# Patient Record
Sex: FEMALE | Race: WHITE | Employment: FULL TIME
[De-identification: names, ages, dates, MRNs, and addresses within clinical notes are randomized per-mention and may not be internally consistent; named-entity substitution may affect disease eponyms.]

---

## 2017-01-17 ENCOUNTER — OFFICE VISIT (OUTPATIENT)
Facility: CLINIC | Age: 42
End: 2017-01-17

## 2017-01-17 VITALS
WEIGHT: 130 LBS | HEIGHT: 65 IN | DIASTOLIC BLOOD PRESSURE: 66 MMHG | OXYGEN SATURATION: 98 % | TEMPERATURE: 98.3 F | BODY MASS INDEX: 21.66 KG/M2 | SYSTOLIC BLOOD PRESSURE: 116 MMHG | HEART RATE: 74 BPM

## 2017-01-17 DIAGNOSIS — J06.9 UPPER RESPIRATORY TRACT INFECTION, UNSPECIFIED TYPE: Primary | ICD-10-CM

## 2017-01-17 PROCEDURE — 99214 OFFICE O/P EST MOD 30 MIN: CPT | Performed by: NURSE PRACTITIONER

## 2017-01-17 RX ORDER — FLUTICASONE PROPIONATE 50 MCG
1 SPRAY, SUSPENSION (ML) NASAL DAILY
Qty: 1 BOTTLE | Refills: 3 | Status: SHIPPED | OUTPATIENT
Start: 2017-01-17 | End: 2018-06-21

## 2017-01-17 RX ORDER — GUAIFENESIN AND CODEINE PHOSPHATE 100; 10 MG/5ML; MG/5ML
5 SOLUTION ORAL 2 TIMES DAILY PRN
Qty: 118 ML | Refills: 0 | Status: SHIPPED | OUTPATIENT
Start: 2017-01-17 | End: 2017-01-24

## 2017-01-17 ASSESSMENT — ENCOUNTER SYMPTOMS
SORE THROAT: 1
RHINORRHEA: 0
SINUS PRESSURE: 0
COUGH: 1

## 2018-06-21 ENCOUNTER — OFFICE VISIT (OUTPATIENT)
Dept: PRIMARY CARE CLINIC | Age: 43
End: 2018-06-21
Payer: COMMERCIAL

## 2018-06-21 VITALS
DIASTOLIC BLOOD PRESSURE: 72 MMHG | HEART RATE: 77 BPM | WEIGHT: 133 LBS | RESPIRATION RATE: 16 BRPM | SYSTOLIC BLOOD PRESSURE: 100 MMHG | HEIGHT: 65 IN | BODY MASS INDEX: 22.16 KG/M2 | TEMPERATURE: 97.9 F

## 2018-06-21 DIAGNOSIS — Z00.00 ROUTINE GENERAL MEDICAL EXAMINATION AT A HEALTH CARE FACILITY: Primary | ICD-10-CM

## 2018-06-21 DIAGNOSIS — R22.9 LOCALIZED SKIN MASS, LUMP, OR SWELLING: ICD-10-CM

## 2018-06-21 DIAGNOSIS — Z12.39 SCREENING FOR BREAST CANCER: ICD-10-CM

## 2018-06-21 DIAGNOSIS — N39.3 STRESS INCONTINENCE: ICD-10-CM

## 2018-06-21 PROCEDURE — 99396 PREV VISIT EST AGE 40-64: CPT | Performed by: NURSE PRACTITIONER

## 2018-06-21 ASSESSMENT — ENCOUNTER SYMPTOMS
DIARRHEA: 0
ABDOMINAL PAIN: 0
ROS SKIN COMMENTS: MASS
BACK PAIN: 0
RHINORRHEA: 0
SHORTNESS OF BREATH: 0
NAUSEA: 0
COUGH: 0
VOMITING: 0
CONSTIPATION: 0

## 2018-06-21 ASSESSMENT — PATIENT HEALTH QUESTIONNAIRE - PHQ9
2. FEELING DOWN, DEPRESSED OR HOPELESS: 0
SUM OF ALL RESPONSES TO PHQ9 QUESTIONS 1 & 2: 0
1. LITTLE INTEREST OR PLEASURE IN DOING THINGS: 0
SUM OF ALL RESPONSES TO PHQ QUESTIONS 1-9: 0

## 2018-07-31 ENCOUNTER — TELEPHONE (OUTPATIENT)
Dept: PRIMARY CARE CLINIC | Age: 43
End: 2018-07-31

## 2018-07-31 ENCOUNTER — HOSPITAL ENCOUNTER (EMERGENCY)
Age: 43
Discharge: HOME | End: 2018-07-31
Attending: EMERGENCY MEDICINE
Payer: COMMERCIAL

## 2018-07-31 ENCOUNTER — APPOINTMENT (OUTPATIENT)
Dept: ULTRASOUND IMAGING | Age: 43
End: 2018-07-31
Payer: COMMERCIAL

## 2018-07-31 VITALS
HEIGHT: 65 IN | SYSTOLIC BLOOD PRESSURE: 108 MMHG | DIASTOLIC BLOOD PRESSURE: 73 MMHG | WEIGHT: 135 LBS | BODY MASS INDEX: 22.49 KG/M2 | TEMPERATURE: 98.1 F | RESPIRATION RATE: 15 BRPM | OXYGEN SATURATION: 98 % | HEART RATE: 80 BPM

## 2018-07-31 DIAGNOSIS — O20.0 THREATENED MISCARRIAGE: Primary | ICD-10-CM

## 2018-07-31 DIAGNOSIS — N93.9 VAGINAL BLEEDING: ICD-10-CM

## 2018-07-31 LAB
-: NORMAL
ABO/RH: NORMAL
ABSOLUTE EOS #: 0.05 K/UL (ref 0–0.44)
ABSOLUTE IMMATURE GRANULOCYTE: <0.03 K/UL (ref 0–0.3)
ABSOLUTE LYMPH #: 2.08 K/UL (ref 1.1–3.7)
ABSOLUTE MONO #: 0.64 K/UL (ref 0.1–1.2)
AMORPHOUS: NORMAL
ANION GAP SERPL CALCULATED.3IONS-SCNC: 10 MMOL/L (ref 9–17)
BACTERIA: NORMAL
BASOPHILS # BLD: 0 % (ref 0–2)
BASOPHILS ABSOLUTE: <0.03 K/UL (ref 0–0.2)
BILIRUBIN URINE: NEGATIVE
BUN BLDV-MCNC: 22 MG/DL (ref 6–20)
BUN/CREAT BLD: ABNORMAL (ref 9–20)
CALCIUM SERPL-MCNC: 9 MG/DL (ref 8.6–10.4)
CASTS UA: NORMAL /LPF (ref 0–8)
CHLORIDE BLD-SCNC: 102 MMOL/L (ref 98–107)
CO2: 24 MMOL/L (ref 20–31)
COLOR: YELLOW
COMMENT UA: ABNORMAL
CREAT SERPL-MCNC: 0.72 MG/DL (ref 0.5–0.9)
CRYSTALS, UA: NORMAL /HPF
DIFFERENTIAL TYPE: ABNORMAL
EOSINOPHILS RELATIVE PERCENT: 1 % (ref 1–4)
EPITHELIAL CELLS UA: NORMAL /HPF (ref 0–5)
GFR AFRICAN AMERICAN: >60 ML/MIN
GFR NON-AFRICAN AMERICAN: >60 ML/MIN
GFR SERPL CREATININE-BSD FRML MDRD: ABNORMAL ML/MIN/{1.73_M2}
GFR SERPL CREATININE-BSD FRML MDRD: ABNORMAL ML/MIN/{1.73_M2}
GLUCOSE BLD-MCNC: 95 MG/DL (ref 70–99)
GLUCOSE URINE: NEGATIVE
HCG QUANTITATIVE: 3058 IU/L
HCG(URINE) PREGNANCY TEST: POSITIVE
HCT VFR BLD CALC: 35 % (ref 36.3–47.1)
HEMOGLOBIN: 11.7 G/DL (ref 11.9–15.1)
IMMATURE GRANULOCYTES: 0 %
KETONES, URINE: ABNORMAL
LEUKOCYTE ESTERASE, URINE: NEGATIVE
LYMPHOCYTES # BLD: 32 % (ref 24–43)
MCH RBC QN AUTO: 32.6 PG (ref 25.2–33.5)
MCHC RBC AUTO-ENTMCNC: 33.4 G/DL (ref 28.4–34.8)
MCV RBC AUTO: 97.5 FL (ref 82.6–102.9)
MONOCYTES # BLD: 10 % (ref 3–12)
MUCUS: NORMAL
NITRITE, URINE: NEGATIVE
NRBC AUTOMATED: 0 PER 100 WBC
OTHER OBSERVATIONS UA: NORMAL
PDW BLD-RTO: 11.5 % (ref 11.8–14.4)
PH UA: 5.5 (ref 5–8)
PLATELET # BLD: 150 K/UL (ref 138–453)
PLATELET ESTIMATE: ABNORMAL
PMV BLD AUTO: 10.9 FL (ref 8.1–13.5)
POTASSIUM SERPL-SCNC: 3.9 MMOL/L (ref 3.7–5.3)
PROTEIN UA: NEGATIVE
RBC # BLD: 3.59 M/UL (ref 3.95–5.11)
RBC # BLD: ABNORMAL 10*6/UL
RBC UA: NORMAL /HPF (ref 0–4)
RENAL EPITHELIAL, UA: NORMAL /HPF
SEG NEUTROPHILS: 57 % (ref 36–65)
SEGMENTED NEUTROPHILS ABSOLUTE COUNT: 3.74 K/UL (ref 1.5–8.1)
SODIUM BLD-SCNC: 136 MMOL/L (ref 135–144)
SPECIFIC GRAVITY UA: 1.04 (ref 1–1.03)
TRICHOMONAS: NORMAL
TURBIDITY: CLEAR
URINE HGB: ABNORMAL
UROBILINOGEN, URINE: NORMAL
WBC # BLD: 6.5 K/UL (ref 3.5–11.3)
WBC # BLD: ABNORMAL 10*3/UL
WBC UA: NORMAL /HPF (ref 0–5)
YEAST: NORMAL

## 2018-07-31 PROCEDURE — 80048 BASIC METABOLIC PNL TOTAL CA: CPT

## 2018-07-31 PROCEDURE — 86901 BLOOD TYPING SEROLOGIC RH(D): CPT

## 2018-07-31 PROCEDURE — 84703 CHORIONIC GONADOTROPIN ASSAY: CPT

## 2018-07-31 PROCEDURE — 84702 CHORIONIC GONADOTROPIN TEST: CPT

## 2018-07-31 PROCEDURE — 85025 COMPLETE CBC W/AUTO DIFF WBC: CPT

## 2018-07-31 PROCEDURE — 99284 EMERGENCY DEPT VISIT MOD MDM: CPT

## 2018-07-31 PROCEDURE — 81001 URINALYSIS AUTO W/SCOPE: CPT

## 2018-07-31 PROCEDURE — 86900 BLOOD TYPING SEROLOGIC ABO: CPT

## 2018-07-31 PROCEDURE — 76817 TRANSVAGINAL US OBSTETRIC: CPT

## 2018-07-31 ASSESSMENT — PAIN DESCRIPTION - PAIN TYPE: TYPE: ACUTE PAIN

## 2018-07-31 ASSESSMENT — PAIN DESCRIPTION - LOCATION: LOCATION: OTHER (COMMENT)

## 2018-07-31 ASSESSMENT — ENCOUNTER SYMPTOMS
NAUSEA: 0
CHOKING: 0
COLOR CHANGE: 0
COUGH: 0
CHEST TIGHTNESS: 0
STRIDOR: 0
ABDOMINAL DISTENTION: 0
APNEA: 0
SHORTNESS OF BREATH: 0
DIARRHEA: 0
WHEEZING: 0

## 2018-07-31 ASSESSMENT — PAIN SCALES - GENERAL: PAINLEVEL_OUTOF10: 2

## 2018-07-31 ASSESSMENT — PAIN DESCRIPTION - ORIENTATION: ORIENTATION: MID;LOWER

## 2018-07-31 NOTE — ED NOTES
Patient back to room from ultrasound, NAD noted. Call light within reach, will continue to monitor.      Radha Baca RN  07/31/18 9721

## 2018-07-31 NOTE — TELEPHONE ENCOUNTER
Patient is wondering if you could order a repeat hcg lab draw for her. She called her OBGYN and they wanted her to come in for a visit , patient was not sure if the  was not understanding what she was taking about but she was wondering if you could order her the lab for Thursday to be checked. Please advise. Thank you.

## 2018-07-31 NOTE — ED TRIAGE NOTES
Pt presents to ED reporting heavy vaginal bleeding started around 8pm last night saturating several pads, pt unsure if she's pregnant and might be having a miscarriage but has not taken a pregnancy test, states last monthly cycle was in June and was due to start around 7/18 stating \"I have a 9 month old and we've been trying to get pregnant. \"  Pt also c/o suprapubic pain stating she took tylenol and Ibuprofen around 8pm and 11pm last night.

## 2018-07-31 NOTE — ED PROVIDER NOTES
[facility]     Emergency Department     Faculty Attestation    I performed a history and physical examination of the patient and discussed management with the resident. I have reviewed and agree with the residents findings including all diagnostic interpretations, and treatment plans as written. Any areas of disagreement are noted on the chart. I was personally present for the key portions of any procedures. I have documented in the chart those procedures where I was not present during the key portions. I have reviewed the emergency nurses triage note. I agree with the chief complaint, past medical history, past surgical history, allergies, medications, social and family history as documented unless otherwise noted below. Documentation of the HPI, Physical Exam and Medical Decision Making performed by scribayana is based on my personal performance of the HPI, PE and MDM. For Physician Assistant/ Nurse Practitioner cases/documentation I have personally evaluated this patient and have completed at least one if not all key elements of the E/M (history, physical exam, and MDM). Additional findings are as noted. 40 yo F vaginal bleed, unsure of pregnancy status, no fever no vomit   Pe: abdomen soft no guard rebound or rigidity,     O+, us no adnexal mass, no iup, threatened ab, ob notified will follow up, vss, hgb stable,       Pre-hypertension/Hypertension: The patient has been informed that they may have pre-hypertension or Hypertension based on a blood pressure reading in the emergency department. I recommend that the patient call the primary care provider listed on their discharge instructions or a physician of their choice this week to arrange follow up for further evaluation of possible pre-hypertension or Hypertension.       EKG Interpretation    Interpreted by me      CRITICAL CARE: There was a high probability of clinically significant/life threatening

## 2018-07-31 NOTE — ED NOTES
Patient provided with mesh panties and pads, NAD noted rr even and unlabored, pt denies further needs at this time. Call light within reach, will continue to monitor.      Imani Soto RN  07/31/18 8022

## 2018-07-31 NOTE — ED NOTES
Patient resting in bed, NAD noted rr even and unlabored, pt provided with warm blankets and lights dimmed. Call light within reach, will continue to monitor.       Purvi Hardin RN  07/31/18 4438

## 2018-08-06 DIAGNOSIS — N93.9 VAGINAL BLEEDING: ICD-10-CM

## 2018-08-06 DIAGNOSIS — O20.0 THREATENED MISCARRIAGE: ICD-10-CM

## 2018-09-06 ENCOUNTER — HOSPITAL ENCOUNTER (OUTPATIENT)
Age: 43
Setting detail: SPECIMEN
Discharge: HOME | End: 2018-09-06
Payer: COMMERCIAL

## 2018-09-06 ENCOUNTER — OFFICE VISIT (OUTPATIENT)
Dept: PRIMARY CARE CLINIC | Age: 43
End: 2018-09-06
Payer: COMMERCIAL

## 2018-09-06 VITALS
BODY MASS INDEX: 22.49 KG/M2 | HEIGHT: 65 IN | SYSTOLIC BLOOD PRESSURE: 92 MMHG | RESPIRATION RATE: 18 BRPM | DIASTOLIC BLOOD PRESSURE: 64 MMHG | TEMPERATURE: 98.2 F | WEIGHT: 135 LBS

## 2018-09-06 DIAGNOSIS — R11.0 NAUSEA: ICD-10-CM

## 2018-09-06 DIAGNOSIS — Z87.59 HISTORY OF MISCARRIAGE: ICD-10-CM

## 2018-09-06 DIAGNOSIS — N39.0 URINARY TRACT INFECTION WITH HEMATURIA, SITE UNSPECIFIED: ICD-10-CM

## 2018-09-06 DIAGNOSIS — R31.9 URINARY TRACT INFECTION WITH HEMATURIA, SITE UNSPECIFIED: ICD-10-CM

## 2018-09-06 DIAGNOSIS — R10.9 ABDOMINAL CRAMPING: Primary | ICD-10-CM

## 2018-09-06 DIAGNOSIS — J02.9 SORE THROAT: ICD-10-CM

## 2018-09-06 LAB
-: NORMAL
AMORPHOUS: NORMAL
BACTERIA: NORMAL
BILIRUBIN URINE: NEGATIVE
BILIRUBIN, POC: NORMAL
BLOOD URINE, POC: NORMAL
CASTS UA: NORMAL /LPF (ref 0–8)
CLARITY, POC: NORMAL
COLOR, POC: NORMAL
COLOR: ABNORMAL
COMMENT UA: ABNORMAL
CONTROL: PRESENT
CRYSTALS, UA: NORMAL /HPF
EPITHELIAL CELLS UA: NORMAL /HPF (ref 0–5)
GLUCOSE URINE, POC: NORMAL
GLUCOSE URINE: NEGATIVE
KETONES, POC: NORMAL
KETONES, URINE: NEGATIVE
LEUKOCYTE EST, POC: NORMAL
LEUKOCYTE ESTERASE, URINE: NEGATIVE
MUCUS: NORMAL
NITRITE, POC: NORMAL
NITRITE, URINE: NEGATIVE
OTHER OBSERVATIONS UA: NORMAL
PH UA: 5.5 (ref 5–8)
PH, POC: 6
PREGNANCY TEST URINE, POC: NORMAL
PROTEIN UA: ABNORMAL
PROTEIN, POC: 30
RBC UA: NORMAL /HPF (ref 0–4)
RENAL EPITHELIAL, UA: NORMAL /HPF
S PYO AG THROAT QL: NORMAL
SPECIFIC GRAVITY UA: 1.04 (ref 1–1.03)
SPECIFIC GRAVITY, POC: 1.02
TRICHOMONAS: NORMAL
TURBIDITY: ABNORMAL
URINE HGB: ABNORMAL
UROBILINOGEN, POC: 1
UROBILINOGEN, URINE: NORMAL
WBC UA: NORMAL /HPF (ref 0–5)
YEAST: NORMAL

## 2018-09-06 PROCEDURE — 81002 URINALYSIS NONAUTO W/O SCOPE: CPT | Performed by: NURSE PRACTITIONER

## 2018-09-06 PROCEDURE — 81025 URINE PREGNANCY TEST: CPT | Performed by: NURSE PRACTITIONER

## 2018-09-06 PROCEDURE — 87880 STREP A ASSAY W/OPTIC: CPT | Performed by: NURSE PRACTITIONER

## 2018-09-06 PROCEDURE — 99214 OFFICE O/P EST MOD 30 MIN: CPT | Performed by: NURSE PRACTITIONER

## 2018-09-06 RX ORDER — CEPHALEXIN 500 MG/1
500 CAPSULE ORAL 2 TIMES DAILY
Qty: 14 CAPSULE | Refills: 0 | Status: SHIPPED | OUTPATIENT
Start: 2018-09-06 | End: 2018-09-13

## 2018-09-06 ASSESSMENT — ENCOUNTER SYMPTOMS
VOMITING: 0
SORE THROAT: 1
SINUS PAIN: 0
WHEEZING: 0
FLATUS: 0
HEMATOCHEZIA: 0
SWOLLEN GLANDS: 0
COUGH: 0
TROUBLE SWALLOWING: 0
ABDOMINAL PAIN: 0
DIARRHEA: 1
CRAMPS: 1
BELCHING: 0
NAUSEA: 1
BLOOD IN STOOL: 0
CONSTIPATION: 0
SHORTNESS OF BREATH: 0
RHINORRHEA: 0
SINUS PRESSURE: 0

## 2018-09-06 NOTE — PROGRESS NOTES
Visit Information    Have you changed or started any medications since your last visit including any over-the-counter medicines, vitamins, or herbal medicines? no   Are you having any side effects from any of your medications? -  no  Have you stopped taking any of your medications? Is so, why? -  no    Have you seen any other physician or provider since your last visit? No  Have you had any other diagnostic tests since your last visit? No  Have you been seen in the emergency room and/or had an admission to a hospital since we last saw you? No  Have you had your routine dental cleaning in the past 6 months? yes -May    Have you activated your [employer] account? If not, what are your barriers?  Yes     Patient Care Team:  INNA Pelletier - CNP as PCP - General (Family Nurse Practitioner)    Medical History Review  Past Medical, Family, and Social History reviewed and does not contribute to the patient presenting condition    Health Maintenance   Topic Date Due    Flu vaccine (1) 09/01/2018    Cervical cancer screen  10/17/2019    Lipid screen  10/05/2021    DTaP/Tdap/Td vaccine (2 - Td) 07/10/2027    HIV screen  Completed

## 2018-09-06 NOTE — PROGRESS NOTES
a miscarriage. She had discussed with her gynecologist about doing a D&C although they opted for nonsurgical cytotec. She has since had her menstrual cycle it started August 31, 2018. History reviewed. No pertinent past medical history. Past Surgical History:   Procedure Laterality Date    CYST REMOVAL      INTRAUTERINE DEVICE INSERTION      paraguard    LEEP  12/28/2016       Family History   Problem Relation Age of Onset    Cancer Maternal Grandmother         breast       Social History   Substance Use Topics    Smoking status: Former Smoker     Packs/day: 1.00     Years: 15.00     Types: Cigarettes     Quit date: 7/19/2009    Smokeless tobacco: Never Used    Alcohol use No      Current Outpatient Prescriptions   Medication Sig Dispense Refill    cephALEXin (KEFLEX) 500 MG capsule Take 1 capsule by mouth 2 times daily for 7 days 14 capsule 0     No current facility-administered medications for this visit. Allergies   Allergen Reactions    Other      vicryl sutures    Sulfa Antibiotics Hives       Health Maintenance   Topic Date Due    Flu vaccine (1) 09/01/2018    Cervical cancer screen  10/17/2019    Lipid screen  10/05/2021    DTaP/Tdap/Td vaccine (2 - Td) 07/10/2027    HIV screen  Completed       Subjective:      Review of Systems   Constitutional: Negative for chills, fever and weight loss. HENT: Positive for sore throat. Negative for congestion, drooling, ear pain, postnasal drip, rhinorrhea, sinus pain, sinus pressure and trouble swallowing. Eyes: Negative for visual disturbance. Respiratory: Negative for cough, shortness of breath and wheezing. Cardiovascular: Negative for chest pain and palpitations. Gastrointestinal: Positive for diarrhea (mild yesterday but none today) and nausea. Negative for abdominal pain, anorexia, blood in stool, constipation, flatus, hematochezia, melena and vomiting.    Genitourinary: Negative for difficulty urinating, dysuria, flank pain, did discuss if her abdominal cramping worsens anytime she develops severe pain fevers chills seeking emergent care. She was also informed to call her oncologist office today to schedule follow-up she is going to do this as soon as she leaves she states. Return if symptoms worsen or fail to improve. Orders Placed This Encounter   Procedures    Strep A culture, throat    Urinalysis Reflex to Culture     Standing Status:   Future     Standing Expiration Date:   9/6/2019     Order Specific Question:   SPECIFY(EX-CATH,MIDSTREAM,CYSTO,ETC)? Answer:   midstream    POCT rapid strep A    POCT HCG, Prenancy, Ur    POCT Urinalysis no Micro    POCT urine pregnancy     Orders Placed This Encounter   Medications    cephALEXin (KEFLEX) 500 MG capsule     Sig: Take 1 capsule by mouth 2 times daily for 7 days     Dispense:  14 capsule     Refill:  0       Patient given educational materials - see patient instructions. Discussed use, benefit, and side effects of prescribed medications. All patient questions answered. Pt voiced understanding. Reviewed health maintenance. Instructed to continue current medications, diet and exercise. Patient agreed with treatment plan. Follow up as directed. Electronically signed by INNA Pelletier CNP on 9/6/2018 at 3:27 PM       Of the 25 minute duration appointment visit, Demetria Aldana CNP spent at least 50% of the face-to-face time in counseling, explanation of diagnosis, planning of further management, and answering all questions.

## 2018-09-06 NOTE — PATIENT INSTRUCTIONS
Patient Education        Abdominal Pain: Care Instructions  Your Care Instructions    Abdominal pain has many possible causes. Some aren't serious and get better on their own in a few days. Others need more testing and treatment. If your pain continues or gets worse, you need to be rechecked and may need more tests to find out what is wrong. You may need surgery to correct the problem. Don't ignore new symptoms, such as fever, nausea and vomiting, urination problems, pain that gets worse, and dizziness. These may be signs of a more serious problem. Your doctor may have recommended a follow-up visit in the next 8 to 12 hours. If you are not getting better, you may need more tests or treatment. The doctor has checked you carefully, but problems can develop later. If you notice any problems or new symptoms, get medical treatment right away. Follow-up care is a key part of your treatment and safety. Be sure to make and go to all appointments, and call your doctor if you are having problems. It's also a good idea to know your test results and keep a list of the medicines you take. How can you care for yourself at home? · Rest until you feel better. · To prevent dehydration, drink plenty of fluids, enough so that your urine is light yellow or clear like water. Choose water and other caffeine-free clear liquids until you feel better. If you have kidney, heart, or liver disease and have to limit fluids, talk with your doctor before you increase the amount of fluids you drink. · If your stomach is upset, eat mild foods, such as rice, dry toast or crackers, bananas, and applesauce. Try eating several small meals instead of two or three large ones. · Wait until 48 hours after all symptoms have gone away before you have spicy foods, alcohol, and drinks that contain caffeine. · Do not eat foods that are high in fat. · Avoid anti-inflammatory medicines such as aspirin, ibuprofen (Advil, Motrin), and naproxen (Aleve). These can cause stomach upset. Talk to your doctor if you take daily aspirin for another health problem. When should you call for help? Call 911 anytime you think you may need emergency care. For example, call if:    · You passed out (lost consciousness).     · You pass maroon or very bloody stools.     · You vomit blood or what looks like coffee grounds.     · You have new, severe belly pain.    Call your doctor now or seek immediate medical care if:    · Your pain gets worse, especially if it becomes focused in one area of your belly.     · You have a new or higher fever.     · Your stools are black and look like tar, or they have streaks of blood.     · You have unexpected vaginal bleeding.     · You have symptoms of a urinary tract infection. These may include:  ¨ Pain when you urinate. ¨ Urinating more often than usual.  ¨ Blood in your urine.     · You are dizzy or lightheaded, or you feel like you may faint.    Watch closely for changes in your health, and be sure to contact your doctor if:    · You are not getting better after 1 day (24 hours). Where can you learn more? Go to [email or web address] and sign in to your [employer] account. Enter R778 in the [employer] box to learn more about \"Abdominal Pain: Care Instructions. \"     If you do not have an account, please click on the \"Sign Up Now\" link. Current as of: November 20, 2017  Content Version: 11.7  © 4212-2368 [employer]. Care instructions adapted under license by [facility] ([facility]). If you have questions about a medical condition or this instruction, always ask your healthcare professional. [place] [address] any warranty or liability for your use of this information. Patient Education          cephalexin  Pronunciation:  ector a GENEVA in  Brand:  Jodie Machuca  What is the most important information I should know about cephalexin?   You should not use this medicine if you are allergic to urination;  · agitation, confusion, hallucinations; or  · severe skin reaction --fever, sore throat, swelling in your face or tongue, burning in your eyes, skin pain followed by a red or purple skin rash that spreads (especially in the face or upper body) and causes blistering and peeling. Common side effects may include:  · diarrhea;  · dizziness, feeling tired;  · headache, joint pain; or  · vaginal itching or discharge. This is not a complete list of side effects and others may occur. Call your doctor for medical advice about side effects. You may report side effects to FDA at 1-190-FDA-3996. What other drugs will affect cephalexin? Other drugs may interact with cephalexin, including prescription and over-the-counter medicines, vitamins, and herbal products. Tell each of your health care providers about all medicines you use now and any medicine you start or stop using. Where can I get more information? Your pharmacist can provide more information about cephalexin. Remember, keep this and all other medicines out of the reach of children, never share your medicines with others, and use this medication only for the indication prescribed. Every effort has been made to ensure that the information provided by [address] is accurate, up-to-date, and complete, but no guarantee is made to that effect. Drug information contained herein may be time sensitive. [employer] information has been compiled for use by healthcare practitioners and consumers in the [place] and therefore [employer] does not warrant that uses outside of the [place] are appropriate, unless specifically indicated otherwise. [employer] drug information does not endorse drugs, diagnose patients or recommend therapy.  [facility] drug information is an informational resource designed to assist licensed healthcare practitioners in caring for their patients and/or to serve consumers viewing this service as a supplement to, and not

## 2018-09-07 LAB
CULTURE: ABNORMAL
CULTURE: ABNORMAL
Lab: ABNORMAL
SPECIMEN DESCRIPTION: ABNORMAL
STATUS: ABNORMAL

## 2018-09-08 LAB
CULTURE: NORMAL
CULTURE: NORMAL
Lab: NORMAL
SPECIMEN DESCRIPTION: NORMAL
STATUS: NORMAL

## 2018-11-07 PROBLEM — R22.2 MASS ON BACK: Status: ACTIVE | Noted: 2018-11-07

## 2019-07-29 ENCOUNTER — OFFICE VISIT (OUTPATIENT)
Dept: OBGYN CLINIC | Age: 44
End: 2019-07-29
Payer: COMMERCIAL

## 2019-07-29 VITALS
HEART RATE: 78 BPM | DIASTOLIC BLOOD PRESSURE: 70 MMHG | HEIGHT: 65 IN | WEIGHT: 129.9 LBS | SYSTOLIC BLOOD PRESSURE: 118 MMHG | BODY MASS INDEX: 21.64 KG/M2

## 2019-07-29 DIAGNOSIS — N91.2 AMENORRHEA: Primary | ICD-10-CM

## 2019-07-29 DIAGNOSIS — O09.299 HX MATERNAL GBS (GROUP B STREPTOCOCCUS) AFFECTED NEONATE, PREGNANT: ICD-10-CM

## 2019-07-29 LAB
CONTROL: PRESENT
PREGNANCY TEST URINE, POC: POSITIVE

## 2019-07-29 PROCEDURE — 81025 URINE PREGNANCY TEST: CPT | Performed by: ADVANCED PRACTICE MIDWIFE

## 2019-07-29 PROCEDURE — 99202 OFFICE O/P NEW SF 15 MIN: CPT | Performed by: ADVANCED PRACTICE MIDWIFE

## 2019-07-29 NOTE — PROGRESS NOTES
SUBJECTIVE:    Chief Complaint:  Chief Complaint   Patient presents with    Amenorrhea     LMP: UNKNOWN        HPI:    Dixie Cruz  is being seen today for missed menses. She is new to the practice. Last birth was at [facility] with GBS infected infant and experience was not positive  Aware of AMA status, was not really preventing pregnancy did not feel she could get pregnant again. Had loss last summer  She reports a  positive pregnancy test on . This  is a planned pregnancy. She is  accepting at this time. LMP: ?       Sure and definite: No     28 day cycle: Yes    She was not on a contraceptive at the time of conception. Estimated weeks gestation today : ?  Tentative MK: ?    Relationship with FOB:     OBJECTIVE:    VS as documented in [place]. Mother's ethnicity:    Father's ethnicity:      She is complaining today of:   Pain: No  Cramping: No  Bleeding or spotting: No    Nausea: No  Vomiting: No    Breast enlargement/tenderness: No  Fatigue: No  Frequency of urination: No    Previous high risk obstetrical history: Yes, AMA and late onset GBS-infected infant  OB History    Para Term  AB Living   4             SAB TAB Ectopic Molar Multiple Live Births             2      # Outcome Date GA Lbr Emmanuel/2nd Weight Sex Delivery Anes PTL Lv   4             3             2             1                 ROS was done and is negative except as documented in HPI. History was reviewed as documented on Epic Navigator.     ASSESSMENT/PLAN:  Missed menses with + UCG, unknown dating  · UCG was done and noted as positive  · Prenatal vitamins were ordered: Already taking  · Ultrasound for dating and viability was ordered: Yes, will obtain pending Hcg results  · OB form was given: No  · Initial information on genetic testing was given:Yes and desires testing given age  · 1 hour glucola was ordered: No  · She was instructed to call with any concerns or worsening

## 2019-07-31 ENCOUNTER — TELEPHONE (OUTPATIENT)
Dept: OBGYN CLINIC | Age: 44
End: 2019-07-31

## 2019-08-02 DIAGNOSIS — N91.2 AMENORRHEA: ICD-10-CM

## 2019-08-03 ENCOUNTER — TELEPHONE (OUTPATIENT)
Dept: OBGYN | Age: 44
End: 2019-08-03

## 2019-08-05 ENCOUNTER — HOSPITAL ENCOUNTER (OUTPATIENT)
Age: 44
Discharge: HOME | End: 2019-08-05
Payer: COMMERCIAL

## 2019-08-05 ENCOUNTER — HOSPITAL ENCOUNTER (OUTPATIENT)
Dept: ULTRASOUND IMAGING | Age: 44
Discharge: HOME | End: 2019-08-07
Payer: COMMERCIAL

## 2019-08-05 DIAGNOSIS — N91.2 AMENORRHEA: ICD-10-CM

## 2019-08-05 PROCEDURE — 36415 COLL VENOUS BLD VENIPUNCTURE: CPT

## 2019-08-05 PROCEDURE — 84702 CHORIONIC GONADOTROPIN TEST: CPT

## 2019-08-05 PROCEDURE — 76801 OB US < 14 WKS SINGLE FETUS: CPT

## 2019-08-06 LAB — HCG QUANTITATIVE: 1575 IU/L

## 2019-08-09 ENCOUNTER — PATIENT MESSAGE (OUTPATIENT)
Dept: OBGYN CLINIC | Age: 44
End: 2019-08-09

## 2019-08-15 ENCOUNTER — TELEPHONE (OUTPATIENT)
Dept: OBGYN CLINIC | Age: 44
End: 2019-08-15

## 2020-03-12 ENCOUNTER — INITIAL PRENATAL (OUTPATIENT)
Dept: OBGYN CLINIC | Age: 45
End: 2020-03-12

## 2020-03-12 ENCOUNTER — HOSPITAL ENCOUNTER (OUTPATIENT)
Age: 45
Setting detail: SPECIMEN
Discharge: HOME | End: 2020-03-12
Payer: COMMERCIAL

## 2020-03-12 VITALS — DIASTOLIC BLOOD PRESSURE: 60 MMHG | SYSTOLIC BLOOD PRESSURE: 112 MMHG | WEIGHT: 133 LBS | BODY MASS INDEX: 22.13 KG/M2

## 2020-03-12 PROCEDURE — 0500F INITIAL PRENATAL CARE VISIT: CPT | Performed by: NURSE PRACTITIONER

## 2020-03-13 LAB
AMPHETAMINE SCREEN URINE: NEGATIVE
BARBITURATE SCREEN URINE: NEGATIVE
BENZODIAZEPINE SCREEN, URINE: NEGATIVE
BILIRUBIN URINE: NEGATIVE
BUPRENORPHINE URINE: NORMAL
CANNABINOID SCREEN URINE: NEGATIVE
CHLAMYDIA BY THIN PREP: NEGATIVE
COCAINE METABOLITE, URINE: NEGATIVE
COLOR: YELLOW
COMMENT UA: NORMAL
CULTURE: ABNORMAL
DIRECT EXAM: NORMAL
GLUCOSE URINE: NEGATIVE
KETONES, URINE: NEGATIVE
LEUKOCYTE ESTERASE, URINE: NEGATIVE
Lab: ABNORMAL
Lab: NORMAL
MDMA URINE: NORMAL
METHADONE SCREEN, URINE: NEGATIVE
METHAMPHETAMINE, URINE: NORMAL
N. GONORRHOEAE DNA, THIN PREP: NEGATIVE
NITRITE, URINE: NEGATIVE
OPIATES, URINE: NEGATIVE
OXYCODONE SCREEN URINE: NEGATIVE
PH UA: 5 (ref 5–8)
PHENCYCLIDINE, URINE: NEGATIVE
PROPOXYPHENE, URINE: NORMAL
PROTEIN UA: NEGATIVE
SPECIFIC GRAVITY UA: 1.02 (ref 1–1.03)
SPECIMEN DESCRIPTION: ABNORMAL
SPECIMEN DESCRIPTION: NORMAL
SPECIMEN DESCRIPTION: NORMAL
TEST INFORMATION: NORMAL
TRICYCLIC ANTIDEPRESSANTS, UR: NORMAL
TURBIDITY: CLEAR
URINE HGB: NEGATIVE
UROBILINOGEN, URINE: NORMAL

## 2020-03-13 NOTE — PATIENT INSTRUCTIONS
After Hours Number: You can call the office [phone]  or [phone]  Call if you have:  1. Bleeding like a period  2. Cramps or contractions greater than 2 hours  3. If you are leaking fluid  4. If you've a fever greater than 100°  5. If you feel as if baby is not moving  6. If you have continuous vomiting over 3-4 hours        Patient was counseled on weight gain in pregnancy with the following recommendation made based on her BMI:     Singletons:  BMI <18.5: 28-40 lbs weight gain  BMI 18.5-24.9: 25-35 lbs weight gain   BMI 25-29.9: 15-25 lbs weight gain  BMI >/= 30: 11-20 lbs weight gain    Up to 16 weeks around 1 pound a month  16-28 weeks- 2-4 pounds a month  >28 weeks - around 1 pound a week due to increased growth and blood volume      Twins:  BMI <18.5: no reccomendations  BMI 18.5-24.9: 37-45 lbs weight gain  BMI 25-29.9: 31-50 lbs weight gain  BMI >/= 30: 25-42 lbs weight gain    During Pregnancy: Exercises  Your Care Instructions  Here are some examples of exercises to do during your pregnancy. Start each exercise slowly. Ease off the exercise if you start to have pain. Your doctor or physical therapist will tell you when you can start these exercises and which ones will work best for you. How to do the exercises  Neck rotation    1. Sit in a firm chair, or stand up straight. 2. Keeping your chin level, turn your head to the right, and hold for 15 to 30 seconds. 3. Turn your head to the left and hold for 15 to 30 seconds. 4. Repeat 2 to 4 times to each side. Forward neck flexion    1. Sit in a firm chair, or stand up straight. 2. Bend your head forward. 3. Hold for 15 to 30 seconds. 4. Repeat 2 to 4 times. Back press    1. Place your feet 10 to 12 inches from the wall. 2. Rest your back flat against the wall and slide down the wall until your knees are slightly bent. 3. Press your lower back against the wall by pulling in your stomach muscles.   4. Hold for 6 seconds, and then relax your stomach muscles and slide back up the wall. 5. Repeat 8 to 12 times. Full body twist    1. Sit with your legs crossed. 2. Reach your left hand toward your right foot, and place your right hand at your side for support. 3. Slowly twist your torso to your right. 4. Switch your hands and twist to your left. 5. Repeat 2 to 4 times. Pelvic rocking    1. Kneeling on hands and knees, place your hands directly under your shoulders and your knees under your hips. 2. Breathe in deeply. Tuck your head downward and round your back up, making a curve with your back in the shape of the letter C. Hold this position for a count of 6.  3. Breathe out slowly and bring your head back up. Relax, keeping your back straight (don't allow it to curve toward the floor). Hold this for a count of 6.  4. Do this exercise 8 times or to your comfort level. Pelvic tilt    1. Lie on your back. 2. Keep your knees relaxed. 3. Tighten your belly and buttocks muscles. 4. At the same time, gently shift your pelvis upward. This should flatten the curve in your back. 5. Hold for 6 seconds and then relax. 6. Gradually increase the number of tilts you do each day, to your comfort level. Backward stretch    1. Kneel on hands and knees with your knees 8 to 10 inches apart, hands directly under your shoulders, and arms and back straight. 2. Keeping your arms straight, slowly lower your buttocks toward your heels and tuck your head toward your knees. Hold for 15 to 30 seconds. 3. Slowly return to the kneeling position. 4. Repeat 2 to 4 times. Forward bend    1. Sit comfortably in a chair, with your arms relaxed. 2. Slowly bend forward, allowing your arms to hang down in front of you. Lean only as far as you can without feeling discomfort or pressure on your belly. 3. Hold for 15 to 30 seconds and then slowly sit up straight. 4. Repeat 2 to 4 times or to your comfort level. Leg lift crawl    1.  Kneeling on hands and knees, place your hands directly under your shoulders and straighten your arms. 2. Tighten your belly muscles by pulling in your belly button toward your spine. Be sure you continue to breathe normally and do not hold your breath. 3. Lift your left knee and bring it toward your elbow. 4. Slowly extend your leg behind you without completely straightening it. Be careful not to let your hip drop down. Avoid arching your back. 5. Hold your leg behind you for about 6 seconds. 6. Return to your starting position. 7. Do the same exercise with your other leg. 8. Repeat 8 to 12 times for each leg. Tailor sitting    1. Sit on the floor. 2. Bring your feet close to your body while crossing your ankles. 3. Hold this position for as long as you are comfortable. Tailor stretching    1. Sit on the floor with your back straight, legs about 12 inches apart, and feet relaxed outward. 2. Stretch your hands forward toward your left foot, then sit up. 3. Stretch your hands straight forward, then sit up. 4. Stretch your hands forward toward your right foot, then sit up. 5. Hold each stretch for 15 to 30 seconds. 6. Repeat 2 to 4 times. Follow-up care is a key part of your treatment and safety. Be sure to make and go to all appointments, and call your doctor if you are having problems. It's also a good idea to know your test results and keep a list of the medicines you take. Where can you learn more? Go to https://PanGenXpeInLive Interactive.sportif225. org and sign in to your [employer] account. Enter V397 in the [facility] box to learn more about \"During Pregnancy: Exercises. \"     If you do not have an account, please click on the \"Sign Up Now\" link. Current as of: September 5, 2018  Content Version: 12.0  © 3369-8022 Healthwise, Incorporated. Care instructions adapted under license by [facility] ([facility]).  If you have questions about a medical condition or this instruction, always ask your healthcare professional. [facility] disclaims any warranty or liability for your use of this information. Nutrition During Pregnancy: Care Instructions  Your Care Instructions    Healthy eating when you are pregnant is important for you and your baby. It can help you feel well and have a successful pregnancy and delivery. During pregnancy your nutrition needs increase. Even if you have excellent eating habits, your doctor may recommend a multivitamin to make sure you get enough iron and folic acid. Many pregnant women wonder how much weight they should gain. In general, women who were at a healthy weight before they became pregnant should gain between 25 and 35 pounds. Women who were overweight before pregnancy are usually advised to gain 15 to 25 pounds. Women who were underweight before pregnancy are usually advised to gain 28 to 40 pounds. Your doctor will work with you to set a weight goal that is right for you. Gaining a healthy amount of weight helps you have a healthy baby. Follow-up care is a key part of your treatment and safety. Be sure to make and go to all appointments, and call your doctor if you are having problems. It's also a good idea to know your test results and keep a list of the medicines you take. How can you care for yourself at home? · Eat plenty of fruits and vegetables. Include a variety of orange, yellow, and leafy dark-green vegetables every day. · Choose whole-grain bread, cereal, and pasta. Good choices include whole wheat bread, whole wheat pasta, brown rice, and oatmeal.  · Get 4 or more servings of milk and milk products each day. Good choices include nonfat or low-fat milk, yogurt, and cheese. If you cannot eat milk products, you can get calcium from calcium-fortified products such as orange juice, soy milk, and tofu. Other non-milk sources of calcium include leafy green vegetables, such as broccoli, kale, mustard greens, turnip greens, bok fernando, and brussels sprouts.   · If you any warranty or liability for your use of this information. Breastfeeding: Care Instructions  Overview      Breastfeeding has many benefits. It may lower your baby's chances of getting an infection. It also may make it less likely that your baby will have problems such as diabetes and obesity later in life. Breastfeeding also helps you bond with your baby. In the first days after birth, your breasts make a thick, yellow liquid called colostrum. This liquid gives your baby nutrients and antibodies against infection. It is all that babies need in the first days after birth. Your breasts will fill with milk a few days after the birth. Breastfeeding is a skill that gets better with practice. Be patient with yourself and your baby. If you have trouble, you can get help and keep breastfeeding. Follow-up care is a key part of your treatment and safety. Be sure to make and go to all appointments, and call your doctor if you are having problems. It's also a good idea to know your test results and keep a list of the medicines you take. How can you care for yourself at home? · Breastfeed your baby whenever he or she is hungry. In the first 2 weeks, your baby will feed about every 1 to 3 hours. That often works out to about 8 to 12 times in a 24-hour period. This will help you keep up your supply of milk. Signs that your baby is hungry include:  ? Sucking on his or her hands. ? [place] his or her lips. ? Turning his or her head toward your breast.  · Put a bed pillow or a nursing pillow on your lap to support your arms and your baby. · Hold your baby in a comfortable position. ? You can hold your baby in several ways. One of the most common positions is the cradle hold. One arm supports your baby, with his or her head in the bend of your elbow. Your open hand supports your baby's bottom or back. Your baby's belly lies against yours. ? If you had your baby by , or , try the football hold.  This hand from supporting your breast and bring it under your baby to cradle him or her. Now just relax and breastfeed your baby. · You will know that your baby is feeding well when:  ? His or her mouth covers a lot of the areola, and the lips are flared out.  ? His or her chin and nose rest against your breast.  ? Sucking is deep and rhythmic, with short pauses. ? You are able to see and hear your baby swallowing. ? You do not feel pain in your nipple. · Offer both breasts to your baby at each feeding. Each time you breastfeed, switch which breast you start with. · Anytime you need to remove your baby from the breast, put one finger in the corner of his or her mouth. Push your finger between your baby's gums to gently break the seal. If you do not break the tight seal before you remove your baby, your nipples can become sore, cracked, or bruised. · After feeding your baby, gently pat his or her back to let out any swallowed air. After your baby burps, offer the breast again, or offer the other breast. Sometimes a baby will want to keep feeding after being burped. When should you call for help? Call your doctor now or seek immediate medical care if:    · You have symptoms of a breast infection, such as:  ? Increased pain, swelling, redness, or warmth around a breast.  ? Red streaks extending from the breast.  ? Pus draining from a breast.  ? A fever. · Your baby has no wet diapers for 6 hours. Watch closely for changes in your health, and be sure to contact your doctor if:    · Your baby has trouble latching on to your breast.     · You continue to have pain or discomfort when breastfeeding. · You have other questions or concerns. Where can you learn more? Go to https://aioTV Inc.solehipix.Sponsia. org and sign in to your [facility] account. Enter P492 in the [employer] box to learn more about \"Breastfeeding: Care Instructions. \"     If you do not have an account, please click on the \"Sign always ask your healthcare professional. [place] [address] any warranty or liability for your use of this information.

## 2020-03-16 RX ORDER — AMOXICILLIN 500 MG/1
500 CAPSULE ORAL 3 TIMES DAILY
Qty: 21 CAPSULE | Refills: 0 | Status: SHIPPED | OUTPATIENT
Start: 2020-03-16 | End: 2020-03-23

## 2020-03-17 ENCOUNTER — TELEPHONE (OUTPATIENT)
Dept: OBGYN CLINIC | Age: 45
End: 2020-03-17

## 2020-03-17 LAB
HPV SAMPLE: NORMAL
HPV, GENOTYPE 16: NOT DETECTED
HPV, GENOTYPE 18: NOT DETECTED
HPV, HIGH RISK OTHER: NOT DETECTED
HPV, INTERPRETATION: NORMAL
SPECIMEN DESCRIPTION: NORMAL

## 2020-03-23 LAB — CYTOLOGY REPORT: NORMAL

## 2020-03-24 ENCOUNTER — OFFICE VISIT (OUTPATIENT)
Dept: OBGYN CLINIC | Age: 45
End: 2020-03-24
Payer: COMMERCIAL

## 2020-03-24 ENCOUNTER — TELEPHONE (OUTPATIENT)
Dept: OBGYN CLINIC | Age: 45
End: 2020-03-24

## 2020-03-24 VITALS
SYSTOLIC BLOOD PRESSURE: 108 MMHG | HEIGHT: 65 IN | RESPIRATION RATE: 14 BRPM | WEIGHT: 133 LBS | DIASTOLIC BLOOD PRESSURE: 62 MMHG | BODY MASS INDEX: 22.16 KG/M2

## 2020-03-24 PROCEDURE — 99213 OFFICE O/P EST LOW 20 MIN: CPT | Performed by: NURSE PRACTITIONER

## 2020-03-24 RX ORDER — MISOPROSTOL 200 UG/1
TABLET ORAL
Qty: 6 TABLET | Refills: 0 | Status: SHIPPED | OUTPATIENT
Start: 2020-03-24 | End: 2020-06-29 | Stop reason: ALTCHOICE

## 2020-03-24 NOTE — PATIENT INSTRUCTIONS
Patient Education        Miscarriage: Care Instructions  Your Care Instructions    The loss of a pregnancy can be very hard. You may wonder why it happened or blame yourself. Miscarriages are common and are not caused by exercise, stress, or sex. Most happen because the fertilized egg in the uterus does not develop normally. There is no treatment that can stop a miscarriage. As long as you do not have heavy blood loss, fever, weakness, or other signs of infection, you can let a miscarriage follow its own course. This can take several days. Your body will recover over the next several weeks. Having a miscarriage does not mean you cannot have a normal pregnancy in the future. The doctor has checked you carefully, but problems can develop later. If you notice any problems or new symptoms, get medical treatment right away. Follow-up care is a key part of your treatment and safety. Be sure to make and go to all appointments, and call your doctor if you are having problems. It's also a good idea to know your test results and keep a list of the medicines you take. How can you care for yourself at home? · You will probably have some vaginal bleeding for 1 to 2 weeks. It may be similar to or slightly heavier than a normal period. The bleeding should get lighter after a week. Use pads instead of tampons. You may use tampons during your next period, which should start in 3 to 6 weeks. · Take an over-the-counter pain medicine, such as acetaminophen (Tylenol), ibuprofen (Advil, Motrin), or naproxen (Aleve) for cramps. Read and follow all instructions on the label. You may have cramps for several days after the miscarriage. · Do not take two or more pain medicines at the same time unless the doctor told you to. Many pain medicines have acetaminophen, which is Tylenol. Too much acetaminophen (Tylenol) can be harmful. · Use a clear container to save any tissue that you pass.  Take it to your doctor's office as soon as you 2019Content Version: 12.4  © 6826-8113 Healthwise, Incorporated. Care instructions adapted under license by [facility] ([facility]). If you have questions about a medical condition or this instruction, always ask your healthcare professional. [address] any warranty or liability for your use of this information.

## 2020-03-24 NOTE — PROGRESS NOTES
Chief Complaint:  Chief Complaint   Patient presents with    Miscarriage       HPI:  Evette Menjivar presents today complaining of vaginal bleeding. This started approximately 4 days ago. She states the bleeding is light,  She describes the bleeding as bright red. Evette Menjivar  denies significant abdominal pain or one sided abdominal pain. Mild cramping across lower abdomen. She denies recent intercourse. Evette Menjivar denies signs of fever or UTI. Sheany STI recently or during the pregnancy. She any change (improved or worsened) in pregnancy symptoms. LMP: No LMP recorded (lmp unknown). Patient is pregnant. LMP was abnormal 2 periods in 2019 2nd one very light   Contraceptive use prior to conception: No  had been contemplating vasectomy  Positive pregnancy test: Yes and urine pregnancy test at home prior to IPV on 3/12/20      Confirmation of dates by US: Yes on 3/12/20 US was 6w2d cardiac activity was visualized at that point but unable to document given early gestational age. Also [facility] was noted recommended return 2 weeks repeat US. Blood Type- O positive     Previous pregnancy history:   SAb or ectopic pregnancy: Yes - 2 previous miscarriages. REVIEW OF SYSTEMS:    1. Constitutional: No fever, chills or malaise. No weight change or fatigue  2. Head and eyes: No headache, dizziness or trauma. No visual changes  3. ENT: No hearing loss, tinnitus, sinus or taste problems  4. Hematologic: No lymphoma, Von Willebrand's, Hemophilia or bruising  5. Psychological: No depression, suicidal thoughts, crying  or anxiety  6. Breast: No skin changes, masses, mastalgia, discharge  7. Respiratory:  No SOB, cough, wheezing  8. Cardiovascular: No chest pain with exertion, palpitations, syncope, or edema  9. Gastrointestinal:  No heartburn, N/V, bloody stools, pain, intermittent mild cramping  10. Genito-urinary: No dysuria, hematuria, dyspareunia,positive vaginal bleeding  11. Musculoskeletal: No arthralgia, gout, muscle weakness  12. Neurological: No CVA, migraines, seizures, syncope, numbness  13. Dermatologic: No rashes, itching, hives  14. Lymphatic: No adenopathy        PHYSICAL EXAM:  Constitutional:   Blood pressure 108/62, resp. rate 14, height 5' 5\" (1.651 m), weight 133 lb (60.3 kg), not currently breastfeeding. General Appearance: This is a well-developed, well-nourished and well-groomed female    Skin:  Inspection of the skin revealed no rashes or lesions    Back:  Straight with no CVA tenderness present    Abdomen: Bowel sounds are present in all quadrants. The abdomen is soft and non-tender. There was no guarding, rebound or rigidity. Pelvic:  declined    Psychiatric:  Alert, oriented to time, place, person and situation. There are no mood or affect changes. ASSESSMENT:   Diagnosis Orders   1. Missed ab  HCG, Quantitative, Pregnancy   2. History of recurrent miscarriages             PLAN:  1. Had serum Quant [facility]CG at [facility] today do not have result yet. States has completed her initial prenatal labs do not have those either. presented as above we discussed treatment plan. Check HCG within 3 days awaiting result of hcg from today,  then every 3 days until <5. Once start bleeding like a period start Cytotec 200mg 3 tablets then 3  Tablets 24 hours after. Informed of adverse effects with this medication monitor for heavy prolonged bleeding. Provided pain medication for discomfort, take as directed informed adverse effects can also take motrin if needed. Provided script for antibiotic to take when starts Cytotec. Counseled possible causes, AMA included. Labs ordered as above.  Discussed possibility folic acid and ASA 96GH and work up if they decide to cocneive again discussed risks with AMA>  Discussed options for contraception also will decide in next few weeks to month after hcg if <5.   W  Notify us any concerns, monitor prolonged heavy bleeding, dizziness,

## 2020-04-27 ENCOUNTER — PATIENT MESSAGE (OUTPATIENT)
Dept: OBGYN CLINIC | Age: 45
End: 2020-04-27

## 2020-04-27 NOTE — TELEPHONE ENCOUNTER
From: Mariana Ledesma  To: Rula Dumont, APRN - CNP  Sent: 4/27/2020 3:25 PM EDT  Subject: Non-Urgent Medical Question    Everett Bush, I hope all is well with you. I am doing well since the miscarriage and am having no issues at all. Thank you for everything you did during that brief time. I am contacting you to ask if you would order the Jublia for toe fungus that i was using at the time i was pregnant with Jose Carlos, but had to stop as instructed. I restarted it, but stopped again each of the 3 times i got pregnant. I wasnt able to do the full treatment in between those pregnancies so it never fully resolved, and now i am out of it. I had 6 toes affected initially and all but one has healed. it has been a stubborn toe to heal, so if there is a new tx that you know of id be willing to try it. I apologize in advance for the toe pic. ..[id number] [facility]!!! ;)

## 2020-04-28 ENCOUNTER — E-VISIT (OUTPATIENT)
Dept: OBGYN CLINIC | Age: 45
End: 2020-04-28
Payer: COMMERCIAL

## 2020-04-28 PROCEDURE — 98970 NQHP OL DIG ASSMT&MGMT 5-10: CPT | Performed by: NURSE PRACTITIONER

## 2020-04-28 RX ORDER — EFINACONAZOLE 100 MG/ML
1 SOLUTION TOPICAL DAILY
Qty: 8 ML | Refills: 0 | Status: SHIPPED | OUTPATIENT
Start: 2020-04-28 | End: 2020-05-20 | Stop reason: SDUPTHER

## 2020-05-20 ENCOUNTER — TELEPHONE (OUTPATIENT)
Dept: FAMILY MEDICINE CLINIC | Age: 45
End: 2020-05-20

## 2020-05-20 RX ORDER — EFINACONAZOLE 100 MG/ML
1 SOLUTION TOPICAL DAILY
Qty: 8 ML | Refills: 0 | Status: SHIPPED | OUTPATIENT
Start: 2020-05-20 | End: 2021-04-21

## 2020-06-29 ENCOUNTER — OFFICE VISIT (OUTPATIENT)
Dept: PRIMARY CARE CLINIC | Age: 45
End: 2020-06-29
Payer: COMMERCIAL

## 2020-06-29 VITALS
BODY MASS INDEX: 22.84 KG/M2 | HEART RATE: 86 BPM | HEIGHT: 64 IN | DIASTOLIC BLOOD PRESSURE: 70 MMHG | RESPIRATION RATE: 16 BRPM | SYSTOLIC BLOOD PRESSURE: 120 MMHG | TEMPERATURE: 98.2 F | WEIGHT: 133.8 LBS | OXYGEN SATURATION: 98 %

## 2020-06-29 PROBLEM — Z82.79 FAMILY HISTORY OF CLEFT PALATE: Status: ACTIVE | Noted: 2017-04-05

## 2020-06-29 PROBLEM — S43.439A INJURY OF SUPERIOR GLENOID LABRUM OF SHOULDER JOINT: Status: ACTIVE | Noted: 2020-06-29

## 2020-06-29 PROBLEM — T81.89XA SUTURE REACTION: Status: ACTIVE | Noted: 2017-01-25

## 2020-06-29 PROBLEM — O09.529 ADVANCED MATERNAL AGE IN MULTIGRAVIDA: Status: ACTIVE | Noted: 2017-04-05

## 2020-06-29 PROBLEM — Z98.890 HISTORY OF LOOP ELECTRICAL EXCISION PROCEDURE (LEEP): Status: ACTIVE | Noted: 2017-11-06

## 2020-06-29 PROBLEM — B97.7 HPV (HUMAN PAPILLOMA VIRUS) INFECTION: Status: ACTIVE | Noted: 2017-12-27

## 2020-06-29 PROCEDURE — 99386 PREV VISIT NEW AGE 40-64: CPT | Performed by: INTERNAL MEDICINE

## 2020-06-29 ASSESSMENT — ENCOUNTER SYMPTOMS
SINUS PAIN: 0
NAUSEA: 0
COUGH: 0
WHEEZING: 0
SINUS PRESSURE: 0
ABDOMINAL PAIN: 0
CONSTIPATION: 0
BACK PAIN: 0
DIARRHEA: 0
ABDOMINAL DISTENTION: 0
SHORTNESS OF BREATH: 0
VOMITING: 0

## 2020-06-29 ASSESSMENT — PATIENT HEALTH QUESTIONNAIRE - PHQ9
SUM OF ALL RESPONSES TO PHQ9 QUESTIONS 1 & 2: 0
SUM OF ALL RESPONSES TO PHQ QUESTIONS 1-9: 0
2. FEELING DOWN, DEPRESSED OR HOPELESS: 0
SUM OF ALL RESPONSES TO PHQ QUESTIONS 1-9: 0
1. LITTLE INTEREST OR PLEASURE IN DOING THINGS: 0

## 2020-06-29 NOTE — PROGRESS NOTES
[facility] PRIMARY CARE  [facility] [address] [facility] [address]  [address] [address]  Dept: [phone]  Dept Fax: [phone]    Mark Anthony Lubin is a 38 y.o. female who presents today for her medical conditions/complaints as noted below. Chief Complaint   Patient presents with   [facility] Patient     Establish Care. Left wrist pain and lower back pain       HPI:     This is a 50-year-old female who is here to establish care. She has past medical history of chronic lower back pain and left wrist surgery in the past.    She is very healthy and athletic and does not take any medications other than multivitamins. She has a lower back pain which is aggravated since she gave birth to her 3year-old. She also has left wrist pain which has gotten worse and would like to see a sports medicine surgeon. No other complaints or concerns and she is due for a mammogram and a blood work part of her annual physical.      No results found for: LABA1C          ( goal A1C is < 7)   No results found for: LABMICR  LDL Cholesterol (mg/dL)   Date Value   10/05/2016 97       (goal LDL is <100)   AST (U/L)   Date Value   10/05/2016 15     ALT (U/L)   Date Value   10/05/2016 11     BUN (mg/dL)   Date Value   2018 22 (H)     BP Readings from Last 3 Encounters:   20 120/70   20 108/62   20 112/60          (goal 120/80)    History reviewed. No pertinent past medical history.    Past Surgical History:   Procedure Laterality Date    COLPOSCOPY      CYST REMOVAL      INTRAUTERINE DEVICE INSERTION      paraguard    LEEP  2016       Family History   Problem Relation Age of Onset    Cancer Maternal Grandmother         breast       Social History     Tobacco Use    Smoking status: Former Smoker     Packs/day: 1.00     Years: 15.00     Pack years: 15.00     Types: Cigarettes     Last attempt to quit: 2009     Years since quitting: 10.9    Smokeless tobacco: Never Used negative. Objective:     Physical Exam  Vitals signs and nursing note reviewed. Constitutional:       General: She is not in acute distress. Appearance: She is well-developed. She is not diaphoretic. HENT:      Head: Normocephalic and atraumatic. Right Ear: Hearing normal.      Left Ear: Hearing normal.      Mouth/Throat:      Pharynx: Uvula midline. Eyes:      General: No scleral icterus. Conjunctiva/sclera: Conjunctivae normal.      Pupils: Pupils are equal, round, and reactive to light. Neck:      Musculoskeletal: Full passive range of motion without pain, normal range of motion and neck supple. Thyroid: No thyroid mass or thyromegaly. Vascular: No JVD. Trachea: Phonation normal.   Cardiovascular:      Rate and Rhythm: Normal rate and regular rhythm. Pulses: No decreased pulses. Carotid pulses are 2+ on the right side and 2+ on the left side. Radial pulses are 2+ on the right side and 2+ on the left side. Heart sounds: Normal heart sounds. No murmur. Pulmonary:      Effort: Pulmonary effort is normal. No accessory muscle usage or respiratory distress. Breath sounds: Normal breath sounds. No wheezing or rales. Abdominal:      General: Bowel sounds are normal. There is no distension. Palpations: Abdomen is soft. Tenderness: There is no abdominal tenderness. Musculoskeletal: Normal range of motion. General: No deformity. Lymphadenopathy:      Cervical: No cervical adenopathy. Skin:     General: Skin is warm. Capillary Refill: Capillary refill takes less than 2 seconds. Findings: No rash. Neurological:      Mental Status: She is alert and oriented to person, place, and time.       Deep Tendon Reflexes: Reflexes normal.   Psychiatric:         Behavior: Behavior normal.       /70   Pulse 86   Temp 98.2 °F (36.8 °C) (Temporal)   Resp 16   Ht 5' 4.25\" (1.632 m)   Wt 133 lb 12.8 oz (60.7 kg)   LMP 06/29/2020 (Exact Date)   SpO2 98%   Breastfeeding Unknown   BMI 22.79 kg/m²     Assessment:          1. Annual physical exam    - CBC Auto Differential; Future  - TSH with Reflex; Future  - Lipid Panel; Future  - Comprehensive Metabolic Panel; Future  - Vitamin B12 & Folate; Future  - Vitamin D 25 Hydroxy; Future  - Hemoglobin A1C; Future    2. Encounter to establish care with new doctor      3. Chronic pain of left wrist    - [address] [facility]    4. Chronic midline low back pain without sciatica    - XR LUMBAR SPINE (MIN 4 VIEWS); Future    5. Breast cancer screening by mammogram    - PABLO DIGITAL SCREEN W CAD BILATERAL; Future            Diagnosis Orders   1. Annual physical exam  CBC Auto Differential    TSH with Reflex    Lipid Panel    Comprehensive Metabolic Panel    Vitamin B12 & Folate    Vitamin D 25 Hydroxy    Hemoglobin A1C   2. Encounter to establish care with new doctor     3. Chronic pain of left wrist  [address] [facility]   4. Chronic midline low back pain without sciatica  XR LUMBAR SPINE (MIN 4 VIEWS)   5. Breast cancer screening by mammogram  PABLO DIGITAL SCREEN W CAD BILATERAL           Plan:      Return in about 6 months (around 12/29/2020).     Orders Placed This Encounter   Procedures    PABLO DIGITAL SCREEN W CAD BILATERAL     Standing Status:   Future     Standing Expiration Date:   6/29/2021     Order Specific Question:   Reason for exam:     Answer:   screening    XR LUMBAR SPINE (MIN 4 VIEWS)     Standing Status:   Future     Standing Expiration Date:   6/29/2021     Order Specific Question:   Reason for exam:     Answer:   pain    CBC Auto Differential     Standing Status:   Future     Standing Expiration Date:   6/29/2021    TSH with Reflex     Standing Status:   Future     Standing Expiration Date:   6/29/2021    Lipid Panel     Standing Status:   Future     Standing Expiration Date:   9/29/2020     Order Specific Question:   Is Patient Fasting?/# of Hours     Answer: Fast 8-10 hours    Comprehensive Metabolic Panel     Standing Status:   Future     Standing Expiration Date:   6/29/2021    Vitamin B12 & Folate     Standing Status:   Future     Standing Expiration Date:   6/29/2021    Vitamin D 25 Hydroxy     Standing Status:   Future     Standing Expiration Date:   6/29/2021    Hemoglobin A1C     Standing Status:   Future     Standing Expiration Date:   6/29/2021   [facility]     Referral Priority:   Routine     Referral Type:   Eval and Treat     Referral Reason:   Specialty Services Required     Requested Specialty:   Orthopedic Surgery     Number of Visits Requested:   1     No orders of the defined types were placed in this encounter. Patient given educational materials - see patient instructions. Discussed use, benefit, and side effects of prescribedmedications. All patient questions answered. Pt voiced understanding. Reviewed health maintenance. Instructed to continue current medications, diet and exercise. Patient agreed with treatment plan. Follow up as directed. I spent a total of 30 minutes face to face with this patient. Over 50% of that time was spent on counseling and care coordination. Please see assessment and plan for details. Electronically signed by Shelia Hilliard MD on 6/29/2020 at 4:43 PM      Please note that this chart was generated using voice recognition Dragon dictation software. Although every effort was made to ensure the accuracy of this automatedtranscription, some errors in transcription may have occurred.

## 2020-07-03 LAB
ALBUMIN SERPL-MCNC: NORMAL G/DL
ALP BLD-CCNC: NORMAL U/L
ALT SERPL-CCNC: NORMAL U/L
ANION GAP SERPL CALCULATED.3IONS-SCNC: NORMAL MMOL/L
AST SERPL-CCNC: NORMAL U/L
AVERAGE GLUCOSE: NORMAL
BILIRUB SERPL-MCNC: NORMAL MG/DL
BUN BLDV-MCNC: NORMAL MG/DL
CALCIUM SERPL-MCNC: NORMAL MG/DL
CHLORIDE BLD-SCNC: NORMAL MMOL/L
CHOLESTEROL, TOTAL: 166 MG/DL
CHOLESTEROL/HDL RATIO: 2.8
CO2: NORMAL
CREAT SERPL-MCNC: NORMAL MG/DL
GFR CALCULATED: NORMAL
GLUCOSE BLD-MCNC: NORMAL MG/DL
HBA1C MFR BLD: 5.5 %
HDLC SERPL-MCNC: 59 MG/DL (ref 35–70)
LDL CHOLESTEROL CALCULATED: 101 MG/DL (ref 0–160)
POTASSIUM SERPL-SCNC: NORMAL MMOL/L
SODIUM BLD-SCNC: NORMAL MMOL/L
TOTAL PROTEIN: NORMAL
TRIGL SERPL-MCNC: 31 MG/DL
TSH SERPL DL<=0.05 MIU/L-ACNC: 1.31 UIU/ML
VITAMIN D 25-HYDROXY: 36.4
VITAMIN D 25-HYDROXY: 5.5
VITAMIN D2, 25 HYDROXY: NORMAL
VITAMIN D2, 25 HYDROXY: NORMAL
VITAMIN D3,25 HYDROXY: NORMAL
VITAMIN D3,25 HYDROXY: NORMAL
VLDLC SERPL CALC-MCNC: 6 MG/DL

## 2021-07-14 ENCOUNTER — TELEPHONE (OUTPATIENT)
Dept: PRIMARY CARE CLINIC | Age: 46
End: 2021-07-14

## 2021-07-14 DIAGNOSIS — Z12.31 VISIT FOR SCREENING MAMMOGRAM: Primary | ICD-10-CM

## 2021-07-14 NOTE — TELEPHONE ENCOUNTER
Patient called in requesting an order be placed for her to get her yearly mammogram. Informed patient that Dr. Minesh Rodriguez is no longer with the practice and that she would need an appointment to establish care with another provider. Patient states she is not currently home and doesn't have her schedule with her. She is asking if order can be place and then she will call back to schedule an appointment. Please Advise.

## 2021-07-22 ENCOUNTER — TELEPHONE (OUTPATIENT)
Dept: PRIMARY CARE CLINIC | Age: 46
End: 2021-07-22

## 2021-08-06 DIAGNOSIS — Z12.31 VISIT FOR SCREENING MAMMOGRAM: ICD-10-CM

## 2022-12-13 ENCOUNTER — HOSPITAL ENCOUNTER (OUTPATIENT)
Age: 47
Setting detail: SPECIMEN
Discharge: HOME | End: 2022-12-13

## 2022-12-13 ENCOUNTER — OFFICE VISIT (OUTPATIENT)
Dept: PRIMARY CARE CLINIC | Age: 47
End: 2022-12-13
Payer: COMMERCIAL

## 2022-12-13 VITALS
BODY MASS INDEX: 23.62 KG/M2 | SYSTOLIC BLOOD PRESSURE: 104 MMHG | OXYGEN SATURATION: 99 % | WEIGHT: 137.6 LBS | HEART RATE: 78 BPM | DIASTOLIC BLOOD PRESSURE: 68 MMHG | RESPIRATION RATE: 14 BRPM

## 2022-12-13 DIAGNOSIS — Z13.0 SCREENING FOR DEFICIENCY ANEMIA: ICD-10-CM

## 2022-12-13 DIAGNOSIS — Z12.31 SCREENING MAMMOGRAM FOR BREAST CANCER: ICD-10-CM

## 2022-12-13 DIAGNOSIS — Z76.89 ENCOUNTER TO ESTABLISH CARE: Primary | ICD-10-CM

## 2022-12-13 DIAGNOSIS — N95.9 MENOPAUSAL PROBLEM: ICD-10-CM

## 2022-12-13 DIAGNOSIS — Z13.29 SCREENING FOR THYROID DISORDER: ICD-10-CM

## 2022-12-13 DIAGNOSIS — M54.2 ACUTE NECK PAIN: ICD-10-CM

## 2022-12-13 DIAGNOSIS — Z12.11 SCREENING FOR COLON CANCER: ICD-10-CM

## 2022-12-13 PROBLEM — O09.529 ADVANCED MATERNAL AGE IN MULTIGRAVIDA: Status: RESOLVED | Noted: 2017-04-05 | Resolved: 2022-12-13

## 2022-12-13 PROBLEM — Z82.79 FAMILY HISTORY OF CLEFT PALATE: Status: RESOLVED | Noted: 2017-04-05 | Resolved: 2022-12-13

## 2022-12-13 PROBLEM — O09.299 HX MATERNAL GBS (GROUP B STREPTOCOCCUS) AFFECTED NEONATE, PREGNANT: Status: RESOLVED | Noted: 2019-07-29 | Resolved: 2022-12-13

## 2022-12-13 LAB
ALBUMIN SERPL-MCNC: 4.6 G/DL (ref 3.5–5.2)
ALBUMIN/GLOBULIN RATIO: 1.6 (ref 1–2.5)
ALP BLD-CCNC: 109 U/L (ref 35–104)
ALT SERPL-CCNC: 15 U/L (ref 5–33)
ANION GAP SERPL CALCULATED.3IONS-SCNC: 12 MMOL/L (ref 9–17)
AST SERPL-CCNC: 18 U/L
BILIRUB SERPL-MCNC: 0.2 MG/DL (ref 0.3–1.2)
BUN BLDV-MCNC: 21 MG/DL (ref 6–20)
CALCIUM SERPL-MCNC: 9.7 MG/DL (ref 8.6–10.4)
CHLORIDE BLD-SCNC: 103 MMOL/L (ref 98–107)
CO2: 27 MMOL/L (ref 20–31)
CREAT SERPL-MCNC: 0.76 MG/DL (ref 0.5–0.9)
GFR SERPL CREATININE-BSD FRML MDRD: >60 ML/MIN/1.73M2
GLUCOSE BLD-MCNC: 77 MG/DL (ref 70–99)
HCT VFR BLD CALC: 42.1 % (ref 36.3–47.1)
HEMOGLOBIN: 13.8 G/DL (ref 11.9–15.1)
MCH RBC QN AUTO: 31.7 PG (ref 25.2–33.5)
MCHC RBC AUTO-ENTMCNC: 32.8 G/DL (ref 28.4–34.8)
MCV RBC AUTO: 96.6 FL (ref 82.6–102.9)
NRBC AUTOMATED: 0 PER 100 WBC
PDW BLD-RTO: 11.6 % (ref 11.8–14.4)
PLATELET # BLD: 191 K/UL (ref 138–453)
PMV BLD AUTO: 11.4 FL (ref 8.1–13.5)
POTASSIUM SERPL-SCNC: 4.5 MMOL/L (ref 3.7–5.3)
RBC # BLD: 4.36 M/UL (ref 3.95–5.11)
SODIUM BLD-SCNC: 142 MMOL/L (ref 135–144)
THYROXINE, FREE: 1.39 NG/DL (ref 0.93–1.7)
TOTAL PROTEIN: 7.4 G/DL (ref 6.4–8.3)
TSH SERPL DL<=0.05 MIU/L-ACNC: 1.4 UIU/ML (ref 0.3–5)
WBC # BLD: 4.3 K/UL (ref 3.5–11.3)

## 2022-12-13 PROCEDURE — 99204 OFFICE O/P NEW MOD 45 MIN: CPT | Performed by: NURSE PRACTITIONER

## 2022-12-13 SDOH — ECONOMIC STABILITY: FOOD INSECURITY: WITHIN THE PAST 12 MONTHS, YOU WORRIED THAT YOUR FOOD WOULD RUN OUT BEFORE YOU GOT MONEY TO BUY MORE.: NEVER TRUE

## 2022-12-13 SDOH — HEALTH STABILITY: PHYSICAL HEALTH: ON AVERAGE, HOW MANY DAYS PER WEEK DO YOU ENGAGE IN MODERATE TO STRENUOUS EXERCISE (LIKE A BRISK WALK)?: 3 DAYS

## 2022-12-13 SDOH — ECONOMIC STABILITY: FOOD INSECURITY: WITHIN THE PAST 12 MONTHS, THE FOOD YOU BOUGHT JUST DIDN'T LAST AND YOU DIDN'T HAVE MONEY TO GET MORE.: NEVER TRUE

## 2022-12-13 SDOH — HEALTH STABILITY: PHYSICAL HEALTH: ON AVERAGE, HOW MANY MINUTES DO YOU ENGAGE IN EXERCISE AT THIS LEVEL?: 90 MIN

## 2022-12-13 ASSESSMENT — PATIENT HEALTH QUESTIONNAIRE - PHQ9
2. FEELING DOWN, DEPRESSED OR HOPELESS: 0
SUM OF ALL RESPONSES TO PHQ9 QUESTIONS 1 & 2: 0
SUM OF ALL RESPONSES TO PHQ QUESTIONS 1-9: 0
1. LITTLE INTEREST OR PLEASURE IN DOING THINGS: 0
SUM OF ALL RESPONSES TO PHQ QUESTIONS 1-9: 0

## 2022-12-13 ASSESSMENT — ENCOUNTER SYMPTOMS
ABDOMINAL PAIN: 0
COUGH: 0
CHEST TIGHTNESS: 0
SHORTNESS OF BREATH: 0
SINUS PAIN: 0
EYE ITCHING: 0
SINUS PRESSURE: 0
VOMITING: 0
EYE REDNESS: 0
EYE DISCHARGE: 0
NAUSEA: 0
DIARRHEA: 0
SORE THROAT: 0
TROUBLE SWALLOWING: 0
WHEEZING: 0

## 2022-12-13 ASSESSMENT — SOCIAL DETERMINANTS OF HEALTH (SDOH)
WITHIN THE LAST YEAR, HAVE YOU BEEN AFRAID OF YOUR PARTNER OR EX-PARTNER?: NO
WITHIN THE LAST YEAR, HAVE YOU BEEN HUMILIATED OR EMOTIONALLY ABUSED IN OTHER WAYS BY YOUR PARTNER OR EX-PARTNER?: NO
HOW HARD IS IT FOR YOU TO PAY FOR THE VERY BASICS LIKE FOOD, HOUSING, MEDICAL CARE, AND HEATING?: NOT HARD AT ALL
WITHIN THE LAST YEAR, HAVE YOU BEEN KICKED, HIT, SLAPPED, OR OTHERWISE PHYSICALLY HURT BY YOUR PARTNER OR EX-PARTNER?: NO
WITHIN THE LAST YEAR, HAVE TO BEEN RAPED OR FORCED TO HAVE ANY KIND OF SEXUAL ACTIVITY BY YOUR PARTNER OR EX-PARTNER?: NO

## 2022-12-13 NOTE — PROGRESS NOTES
[address] PRIMARY CARE  [address] [place]  [address]  [address] [address]  Dept: [phone]  Dept Fax: [phone]    Debbie Sandoval is a 49 y.o. female who presents today for her medical conditions/complaintsas noted below. Debbie Sandoval is c/o of Establish Care, Post-COVID Symptoms (Tested positive for covid back in 2021, still having having a \"funny\" feeling in neck, no difficult in swallowing, pt states it feels like something is in there, has minor pain), and Orders (Mammogram, lab work)        HPI:     Pt presents to establish care. Previous pcp dr. Daniele Barrientos  Bp stable  Weight is stable    Pt presents to establish care. Generally healthy    Mammogram over due   She has one daughter who is 5. She had 3 miscarriages after that. After her last miscarriage, she stopped having periods. Since then she has been moodier, and just feels off. Last October woke up with covid. Terrible neck pain. Lump in throat. Every few months she has these things on and off    Eats very healthy.  Exercises regularly      Past Medical History:   Diagnosis Date    Advanced maternal age in multigravida 2017    Anxiety     Family history of cleft palate 2017    Hx maternal GBS (group B streptococcus) affected , pregnant 2019     (spontaneous vaginal delivery) 2017      Past Surgical History:   Procedure Laterality Date    COLPOSCOPY      CYST REMOVAL      INTRAUTERINE DEVICE INSERTION      paraguard    LEEP  2016       Family History   Problem Relation Age of Onset    Cancer Maternal Grandmother         breast    Kidney Disease Maternal Aunt        Social History     Tobacco Use    Smoking status: Former     Packs/day: 1.00     Years: 15.00     Pack years: 15.00     Types: Cigarettes     Quit date: 2009     Years since quittin.4    Smokeless tobacco: Never   Substance Use Topics    Alcohol use: No      No current outpatient medications on file. No current facility-administered medications for this visit. Allergies   Allergen Reactions    Other      vicryl sutures    Sulfa Antibiotics Hives       Health Maintenance   Topic Date Due    COVID-19 Vaccine (1) Never done    Hepatitis C screen  Never done    Colorectal Cancer Screen  Never done    Depression Screen  12/13/2023    Cervical cancer screen  03/12/2025    Lipids  07/03/2025    DTaP/Tdap/Td vaccine (2 - Td or Tdap) 07/10/2027    Flu vaccine  Completed    HIV screen  Completed    Hepatitis A vaccine  Aged Out    Hib vaccine  Aged Out    Meningococcal (ACWY) vaccine  Aged Out    Pneumococcal 0-64 years Vaccine  Aged Out       :     Review of Systems   Constitutional:  Negative for chills, fatigue and fever. HENT:  Negative for ear discharge, ear pain, sinus pressure, sinus pain, sore throat and trouble swallowing. Eyes:  Negative for discharge, redness and itching. Respiratory:  Negative for cough, chest tightness, shortness of breath and wheezing. Cardiovascular:  Negative for chest pain. Gastrointestinal:  Negative for abdominal pain, diarrhea, nausea and vomiting. Genitourinary:  Negative for difficulty urinating. Musculoskeletal:  Positive for neck pain. Negative for arthralgias. Skin:  Negative for rash. Neurological:  Negative for dizziness, weakness, light-headedness and headaches. Psychiatric/Behavioral:          Mood swings   All other systems reviewed and are negative. Objective:     Physical Exam  Constitutional:       Appearance: Normal appearance. She is normal weight. HENT:      Head: Normocephalic and atraumatic. Nose: Nose normal.   Eyes:      Extraocular Movements: Extraocular movements intact. Conjunctiva/sclera: Conjunctivae normal.      Pupils: Pupils are equal, round, and reactive to light. Cardiovascular:      Rate and Rhythm: Normal rate and regular rhythm. Pulses: Normal pulses. Heart sounds: Normal heart sounds. Pulmonary:      Effort: Pulmonary effort is normal.      Breath sounds: Normal breath sounds. Abdominal:      General: Abdomen is flat. Palpations: Abdomen is soft. Musculoskeletal:         General: Normal range of motion. Cervical back: Neck supple. Skin:     General: Skin is warm and dry. Capillary Refill: Capillary refill takes less than 2 seconds. Neurological:      General: No focal deficit present. Mental Status: She is alert and oriented to person, place, and time. Psychiatric:         Mood and Affect: Mood normal.     /68   Pulse 78   Resp 14   Wt 137 lb 9.6 oz (62.4 kg)   SpO2 99%   BMI 23.62 kg/m²     Assessment:       Diagnosis Orders   1. Encounter to establish care        2. Screening mammogram for breast cancer  PABLO DIGITAL SCREEN W OR WO CAD BILATERAL      3. Menopausal problem  Candis Cruz MD OB/GYN, [facility]    Comprehensive Metabolic Panel      4. Screening for colon cancer  Fecal DNA Colorectal cancer screening (Cologuard)      5. Screening for thyroid disorder  TSH    T4, Free    Thyroid Peroxidase Antibody      6. Screening for deficiency anemia  CBC      7. Acute neck pain  XR CERVICAL SPINE (4-5 VIEWS)          :      Return in about 3 months (around 3/13/2023) for physical .  1.  Encounter to establish care  Reviewed previous records and lab work  2. Screening for breast cancer  Mammogram ordered  3. Menopausal problem  Referral to gynecology  4. Screening for colon cancer  Rx for Cologuard  5-6. Screening  Rx for lab work  7. Neck pain  Plan to get x-rays    Patient is going to follow-up in 3 months.   May return sooner if needed  Orders Placed This Encounter   Procedures    Fecal DNA Colorectal cancer screening (Cologuard)    PABLO DIGITAL SCREEN W OR WO CAD BILATERAL     Standing Status:   Future     Standing Expiration Date:   2/13/2024     Order Specific Question:   Reason for exam:     Answer:   breast cancer screening    XR CERVICAL SPINE (4-5 VIEWS)     Standing Status:   Future     Standing Expiration Date:   12/13/2023     Order Specific Question:   Reason for exam:     Answer:   ongoing neck pain    TSH     Standing Status:   Future     Standing Expiration Date:   12/13/2023    T4, Free     Standing Status:   Future     Standing Expiration Date:   12/13/2023    Thyroid Peroxidase Antibody     Standing Status:   Future     Standing Expiration Date:   12/13/2023    CBC     Standing Status:   Future     Standing Expiration Date:   12/13/2023    Comprehensive Metabolic Panel     Standing Status:   Future     Standing Expiration Date:   12/13/2023    Candis Cruz MD OB/GYN, [place]     Referral Priority:   Routine     Referral Type:   Eval and Treat     Referral Reason:   Specialty Services Required     Referred to Provider:   Casimiro Nieves MD     Requested Specialty:   Obstetrics & Gynecology     Number of Visits Requested:   1     No orders of the defined types were placed in this encounter. Patient given educational materials - seepatient instructions. Discussed use, benefit, and side effects of prescribed medications. All patient questions answered. Pt voiced understanding. Reviewed health maintenance. Instructed to continue current medications, diet and exercise. Patient agreedwith treatment plan. Follow up as directed.       Electronically signed by INNA Mtz CNP on 12/13/2022at 12:53 PM

## 2022-12-14 LAB — THYROID PEROXIDASE (TPO) AB: 74 IU/ML (ref 0–25)

## 2022-12-19 ENCOUNTER — TELEPHONE (OUTPATIENT)
Dept: PRIMARY CARE CLINIC | Age: 47
End: 2022-12-19

## 2022-12-19 DIAGNOSIS — R94.6 ABNORMAL THYROID FUNCTION TEST: Primary | ICD-10-CM

## 2022-12-19 NOTE — TELEPHONE ENCOUNTER
Dr. Sagar Cuello is not taking any patient's at this time, neither is [facility]. [facility] is [facility] patients and [facility] are taking patients. Dr. Lydia Dunbar with [facility] does not see Diabetic patients.

## 2022-12-19 NOTE — TELEPHONE ENCOUNTER
Informed the patient that the referral was placed to Dr. Roma Sage via [address] [place] [address] as requested. Referral faxed to Dr. Briana Sterling office.

## 2022-12-19 NOTE — TELEPHONE ENCOUNTER
Patient called and thyroid lab results were reviewed. Patient expressed understanding. Patient would like to go to whichever endocrinologist PCP recommends. Please let her know endocrinologist name and number via MyChart or telephone call.

## 2023-02-21 ENCOUNTER — OFFICE VISIT (OUTPATIENT)
Dept: OBGYN CLINIC | Age: 48
End: 2023-02-21
Payer: COMMERCIAL

## 2023-02-21 ENCOUNTER — HOSPITAL ENCOUNTER (OUTPATIENT)
Age: 48
Setting detail: SPECIMEN
Discharge: HOME | End: 2023-02-21

## 2023-02-21 VITALS
DIASTOLIC BLOOD PRESSURE: 84 MMHG | SYSTOLIC BLOOD PRESSURE: 118 MMHG | WEIGHT: 137 LBS | HEIGHT: 65 IN | BODY MASS INDEX: 22.82 KG/M2

## 2023-02-21 DIAGNOSIS — Z01.419 WELL WOMAN EXAM WITH ROUTINE GYNECOLOGICAL EXAM: Primary | ICD-10-CM

## 2023-02-21 DIAGNOSIS — F41.9 ANXIOUS MOOD: ICD-10-CM

## 2023-02-21 DIAGNOSIS — R45.89 MOODY: ICD-10-CM

## 2023-02-21 DIAGNOSIS — Z11.51 SCREENING FOR HUMAN PAPILLOMAVIRUS (HPV): ICD-10-CM

## 2023-02-21 PROCEDURE — 99396 PREV VISIT EST AGE 40-64: CPT | Performed by: CLINICAL NURSE SPECIALIST

## 2023-02-21 NOTE — PROGRESS NOTES
[facility] GYN[facility]OLOG[facility]  [address] [place] [address] [address].  [place] [address]  Dept: [phone]        DATE OF VISIT:  23        History and Physical    Debbie Sandoval    :  1975  CHIEF COMPLAINT:    Chief Complaint   Patient presents with    Annual Exam                    Debbie Sandoval is a 49 y.o. female who presents for annual well woman exam.  Patient reports that since she has become postmenopausal she has become more linn and becomes anxious. She is requesting some hormone levels be checked. Patient reports that despite her working out she cant tone. The patient was seen and examined. Per the patient bowels areregular. She has no voiding complaints. She denies any bloating as well as vaginal discharge. Chaperone for Intimate Exam  Chaperone was offered as part of the rooming process. Patient declined and agrees to continue with exam without a chaperone.   Chaperone: none     _____________________________________________________________________  Past Medical History:   Diagnosis Date    Advanced maternal age in multigravida 2017    Anxiety     Family history of cleft palate 2017    Hx maternal GBS (group B streptococcus) affected , pregnant 2019     (spontaneous vaginal delivery) 2017                                                                   Past Surgical History:   Procedure Laterality Date    COLPOSCOPY      CYST REMOVAL      INTRAUTERINE DEVICE INSERTION      paraguard    LEEP  2016     Family History   Problem Relation Age of Onset    Cancer Maternal Grandmother         breast    Kidney Disease Maternal Aunt      Social History     Tobacco Use   Smoking Status Former    Packs/day: 1.00    Years: 15.00    Pack years: 15.00    Types: Cigarettes    Quit date: 2009    Years since quittin.6   Smokeless Tobacco Never     Social History     Substance and Sexual Activity   Alcohol Use No     No current outpatient medications on file. No current facility-administered medications for this visit. Allergies: Allergies   Allergen Reactions    Other      vicryl sutures    Sulfa Antibiotics Hives       Gynecologic History:  Patient's last menstrual period was 2020 (exact date). Sexually Active: Yes  STD History:No, HPV  Birth Control: No    OB History    Para Term  AB Living   5 2 2 0 2 0   SAB IAB Ectopic Molar Multiple Live Births   2 0 0 0 0 2     ______________________________________________________________________    Review of Systems    REVIEW OFSYSTEMS:        Constitutional:  Unexpected weight change, extreme fatigue, night sweats              no  Skin:                           Rashes, moles  New mole on neck  yes  Neurological:  Frequentheadaches, seizures         no  Ophthalmic:  Recent visual changes no  ENT:   Difficulty swallowing  no  Breast:              Masses, pain, nipple discharge                            no     Respiratory:  Shortness of breath, coughing           no    Cardiovascular: Chest pain   no     Gastrointestinal: Chronic diarrhea/constipation, nausea/vomiting           no   Urogenital:  Urinary incontinence, frequency, urgency          no                                         Heavy/irregular periods           no                                      Vaginal discharge                   no  Hematological: Bruises easy   no     Endocrine:  Hot flashes Couple times per wk  yes     Hot/Cold Intolerance  no    Psychological:            Mood and affect were within normal limits. Mood swings but feels stable yes                 Physical Exam    Physical Exam:    Vitals:    23 1550   BP: 118/84   Site: Left Upper Arm   Position: Sitting   Cuff Size: Medium Adult   Weight: 137 lb (62.1 kg)   Height: 5' 5\" (1.651 m)       General Appearance: This  is a well developed, well nourished, well groomed female. Her BMI was reviewed. Nutritional decision making andexercise were discussed. Neurological:  The patient is alert and oriented to time,place, person, and situation    Skin:  A brief inspection of the skin revealed no rashes or lesions. Neck:  The neck was supple. Respiratory: There was unlabored respiratory effort. Lungs clear to ascultation. Cardiovascular: The patients extremities were without calf tenderness or edema. Heart with a regular rate and rhythm. Abdomen: The abdomen was soft and non-tender with no guarding, rebound or rigidity. No hernias were appreciated. Breast:   The patients breasts were symmetrical.  There were no masses, discharge or retractions noted. Self breast exams were reviewed. Pelvic Exam:  The external genitalia was with a normal appearance. The vaginal vault was normal. There were no cystocele, rectocele, or enterocele appreciated. There was no vaginal discharge. The cervix was without lesions. There was no cervical motion tenderness. The uterus was mobile, midline and regular. The adnexa no fullness, tenderness or masses appreciated. ASSESSMENT: Normal annual well woman exam    49 y.o. Female; Annual   Diagnosis Orders   1. Well woman exam with routine gynecological exam  PAP Smear      2. Screening for human papillomavirus (HPV)  PAP Smear      3. Jesus  Follicle Stimulating Hormone    Prolactin    Estradiol    Testosterone    TSH    T4, Free    T3, Reverse    Luteinizing Hormone      4. Anxious mood  Follicle Stimulating Hormone    Prolactin    Estradiol    Testosterone    TSH    T4, Free    T3, Reverse    Luteinizing Hormone                      PLAN:  - Discussed new papsmear guidelines. Is not interested in any medication for mood or HRT at this time but is considering it. - Smoking risk factors Discussed  - Diet and exercise reviewed.    - Routine healthmaintenance per patients PCP.  - Return to clinic in 1 year or earlier with questions, problems, concerns.  Return for 1 year for Annual and as needed.        Electronically signed by INNA Archibald CNP on 2/21/2023 at 4:15 PM

## 2023-03-03 DIAGNOSIS — R45.89 MOODY: ICD-10-CM

## 2023-03-03 DIAGNOSIS — F41.9 ANXIOUS MOOD: ICD-10-CM

## 2023-06-14 DIAGNOSIS — Z12.31 SCREENING MAMMOGRAM FOR BREAST CANCER: ICD-10-CM

## 2023-08-03 ENCOUNTER — HOSPITAL ENCOUNTER (OUTPATIENT)
Age: 48
Discharge: HOME | End: 2023-08-03

## 2023-08-03 PROCEDURE — 86481 TB AG RESPONSE T-CELL SUSP: CPT

## 2023-08-07 LAB — T-SPOT TB TEST: NORMAL

## 2023-09-08 ENCOUNTER — TELEPHONE (OUTPATIENT)
Dept: PRIMARY CARE CLINIC | Age: 48
End: 2023-09-08

## 2023-09-08 DIAGNOSIS — R94.6 ABNORMAL THYROID FUNCTION TEST: Primary | ICD-10-CM

## 2023-09-08 NOTE — TELEPHONE ENCOUNTER
Pt called into office, states she tried to call on this a few weeks ago, that someone from [facility] from [facility] tried to call last Friday and pt was following up today. Pt states PCP referred her to Dr Gio Huitron but she sought out a new provider in [place] for more specialized care. Unfortunately writer was unable to find any communications in pts chart that the office has taken a call from pt or anyone else on her behalf. Writer advised she would send a message to PCP for referral. With referral any diagnosis and lab work needs to be sent at well. Writer spoke with PCP's MA, he noted he received forms from office he has to complete and review with PCP.     [facility] at [facility] Patricia Malone, [facility], [address] [place] [address]  [phone]

## 2024-03-13 ENCOUNTER — TELEMEDICINE (OUTPATIENT)
Dept: PRIMARY CARE CLINIC | Age: 49
End: 2024-03-13
Payer: COMMERCIAL

## 2024-03-13 DIAGNOSIS — B96.89 ACUTE BACTERIAL RHINOSINUSITIS: Primary | ICD-10-CM

## 2024-03-13 DIAGNOSIS — J01.90 ACUTE BACTERIAL RHINOSINUSITIS: Primary | ICD-10-CM

## 2024-03-13 PROCEDURE — 99213 OFFICE O/P EST LOW 20 MIN: CPT | Performed by: NURSE PRACTITIONER

## 2024-03-13 RX ORDER — AMOXICILLIN AND CLAVULANATE POTASSIUM 875; 125 MG/1; MG/1
1 TABLET, FILM COATED ORAL 2 TIMES DAILY
Qty: 14 TABLET | Refills: 0 | Status: SHIPPED | OUTPATIENT
Start: 2024-03-13 | End: 2024-03-20

## 2024-03-13 RX ORDER — SOD CHLOR,BICARB/SQUEEZ BOTTLE
1 PACKET, WITH RINSE DEVICE NASAL DAILY
Qty: 1 KIT | Refills: 0 | Status: SHIPPED | OUTPATIENT
Start: 2024-03-13

## 2024-03-13 RX ORDER — FLUTICASONE PROPIONATE 50 MCG
2 SPRAY, SUSPENSION (ML) NASAL DAILY
Qty: 16 G | Refills: 0 | Status: SHIPPED | OUTPATIENT
Start: 2024-03-13

## 2024-03-13 ASSESSMENT — ENCOUNTER SYMPTOMS
SINUS PRESSURE: 1
GASTROINTESTINAL NEGATIVE: 1
WHEEZING: 0
COUGH: 0
CHEST TIGHTNESS: 0
SORE THROAT: 0
SINUS PAIN: 1
TROUBLE SWALLOWING: 0
SHORTNESS OF BREATH: 0

## 2024-03-13 NOTE — PATIENT INSTRUCTIONS
Notify office if you do not improve or have worsening of condition.  Take medication as prescribed.   Flonase 2 sprays each nostril daily x 5 days and then go to 1 spray each nostril daily.  Sinus Rinse as directed.  Drink plenty of fluids and rest.  Practice good hand hygiene.  May sleep with humidifier as needed.  Warm tea with honey.  May take Tylenol/Ibuprofen (alternate) as needed for fever/pain.  Follow up with PCP in 3-4 days if not improving or sooner if worsening.  Please refer to educational handout with [facility].

## 2024-03-13 NOTE — PROGRESS NOTES
10/27/2019, 10/30/2020    Influenza, Intradermal, Preservative free 11/07/2022    TDaP, ADACEL (age 10y-64y), BOOSTRIX (age 10y+), IM, 0.5mL 07/10/2017       PHYSICAL EXAMINATION:  [ INSTRUCTIONS:  \"[x]\" Indicates a positive item  \"[]\" Indicates a negative item  -- DELETE ALL ITEMS NOT EXAMINED]  Vital Signs: (As obtained by patient/caregiver or practitioner observation)    Blood pressure-  Heart rate-    Respiratory rate-    Temperature-  Pulse oximetry-     Constitutional: [x] Appears well-developed and well-nourished [x] No apparent distress      [x] Abnormal- appears not to feel well  Mental status  [x] Alert and awake  [x] Oriented to person/place/time [x]Able to follow commands      Eyes:  EOM    [x]  Normal  [] Abnormal-  Sclera  [x]  Normal  [] Abnormal -         Discharge [x]  None visible  [] Abnormal -    HENT:   [x] Normocephalic, atraumatic.  [] Abnormal   [] Mouth/Throat: Mucous membranes are moist.     External Ears [x] Normal  [] Abnormal-     Neck: [x] No visualized mass     Pulmonary/Chest: [x] Respiratory effort normal.  [x] No visualized signs of difficulty breathing or respiratory distress        [] Abnormal-      Musculoskeletal:   [] Normal gait with no signs of ataxia         [x] Normal range of motion of neck        [] Abnormal-       Neurological:        [x] No Facial Asymmetry (Cranial nerve 7 motor function) (limited exam to video visit)          [x] No gaze palsy        [] Abnormal-         Skin:        [x] No significant exanthematous lesions or discoloration noted on facial skin         [] Abnormal-            Psychiatric:       [x] Normal Affect [] No Hallucinations        [] Abnormal-     Other pertinent observable physical exam findings-     ASSESSMENT/PLAN:  1. Acute bacterial rhinosinusitis  Notify office if you do not improve or have worsening of condition.  Take medication as prescribed.   Flonase 2 sprays each nostril daily x 5 days and then go to 1 spray each nostril

## 2024-10-07 ENCOUNTER — TELEPHONE (OUTPATIENT)
Dept: PRIMARY CARE CLINIC | Age: 49
End: 2024-10-07

## 2024-10-07 DIAGNOSIS — Z12.31 SCREENING MAMMOGRAM FOR BREAST CANCER: Primary | ICD-10-CM

## 2024-10-07 NOTE — TELEPHONE ENCOUNTER
Patient would like order for mammogram.     Please fax to [facility], fax number below.     Fax:[phone]

## 2024-12-13 DIAGNOSIS — Z12.31 SCREENING MAMMOGRAM FOR BREAST CANCER: ICD-10-CM

## 2025-01-07 ENCOUNTER — TELEPHONE (OUTPATIENT)
Dept: PRIMARY CARE CLINIC | Age: 50
End: 2025-01-07